# Patient Record
Sex: FEMALE | Race: OTHER | NOT HISPANIC OR LATINO | URBAN - METROPOLITAN AREA
[De-identification: names, ages, dates, MRNs, and addresses within clinical notes are randomized per-mention and may not be internally consistent; named-entity substitution may affect disease eponyms.]

---

## 2020-06-10 ENCOUNTER — OFFICE (OUTPATIENT)
Dept: URBAN - METROPOLITAN AREA TELEHEALTH 3 | Facility: TELEHEALTH | Age: 40
End: 2020-06-10
Payer: COMMERCIAL

## 2020-06-10 VITALS — HEIGHT: 68 IN | WEIGHT: 171 LBS

## 2020-06-10 DIAGNOSIS — R11.10 VOMITING, UNSPECIFIED: ICD-10-CM

## 2020-06-10 DIAGNOSIS — K59.09 OTHER CONSTIPATION: ICD-10-CM

## 2020-06-10 DIAGNOSIS — R14.2 ERUCTATION: ICD-10-CM

## 2020-06-10 DIAGNOSIS — R12 HEARTBURN: ICD-10-CM

## 2020-06-10 PROCEDURE — 99204 OFFICE O/P NEW MOD 45 MIN: CPT | Mod: 95 | Performed by: PHYSICIAN ASSISTANT

## 2020-06-10 RX ORDER — OMEPRAZOLE 40 MG/1
CAPSULE, DELAYED RELEASE ORAL
Qty: 30 | Refills: 1 | Status: ACTIVE
Start: 2020-06-10

## 2020-06-10 RX ORDER — WHEAT DEXTRIN 3 G/4 G
POWDER (GRAM) ORAL
Qty: 1 | Refills: 0 | Status: ACTIVE
Start: 2020-06-10

## 2020-06-10 NOTE — SERVICENOTES
I have reviewed the history, physical exam, assessment and management plans.  I concur with or have edited all elements of her note., Patient's visit was conducted through [employer] telecommunication. Patient consented before the start of visit as to understanding of privacy concerns, possible technological failure, and their responsibility of carrying out instructions of plan.

## 2020-06-10 NOTE — SERVICEHPINOTES
PATIENT VERIFIED BY DATE OF BIRTH AND NAME. Patient has been consented for this telecommunication visit. Reviewed PmHx, FmHx, SHx. Ms. Madelyn Cho is a 38 yo female that presents for new patient visit concerning "acid reflux and constipation." She reports acid reflux has been a problem for approximately x 4 weeks that has improved a little with GERD diet. She notes associates sx includes belching, bloating,BRregurgitation, heartburn. She states heartburn occurs every two days on average and lasts up to 1 hour: No nocturnal episodes. No dysphagia, nausea, vomiting. She eats dinner by 6:30 pm then "lounging down" on sofa that can trigger regurgitation. In regards to her upper abdominal bloating, she notes this has become worse with start of "whey protein" shakes. No other identifiable food triggers. No trial of lactose free diet. Concerning her constipation, she notes this is a chronic problem for many years that has remained stable. She has BMs every 3 days on average with stool consistency being "smooth" and sensation of incomplete evacuation: No anal/rectal pain. She has tried increased water intake along with high fiber diet in the past with no difference noticed. She has tried prune juice, suppositories, and Miralax from time to time with no improvement. No prior trial of Metamucil, Benefiber, Citrucel. No prior colonoscopy. No fm h/o CRC, IBD, colonic polyps. No known cardiac or pulmonary disease. Rare NSAID use. Denies fevers, chest pain, n/v, dysphagia, early satiety, decreased appetite, abdominal pain, diarrhea, melena, rectal bleeding, weight loss. Review of systems performed (see below), otherwise negative for all other non GI complaints. BR